# Patient Record
(demographics unavailable — no encounter records)

---

## 2017-11-16 NOTE — OR
[facility]
                                    [address], [place]  [address]
_________________________________________________________________________________________
                                                                 Signed   
 
 
DATE OF OPERATION:
11/14/2017
 
SURGEON: Dave Arroyo MD
 
PREOPERATIVE DIAGNOSES: Acalculous cholecystitis, gallbladder sludge with biliary pain.
 
POSTOPERATIVE DIAGNOSIS: Acalculous cholecystitis, gallbladder sludge with biliary pain.
 
PROCEDURES: 1. Laparoscopic cholecystectomy with intraoperative cholangiogram. 2.
Surgeon-directed fluoroscopy.
 
SURGEON: Dave Arroyo M.D.
 
ANESTHESIA: General endotracheal (Jairo Coronado CRNA) and local 20 mL of 0.25% Marcaine
with 
epinephrine.
 
INDICATION: This 50-year-old white woman is the patient Dr. Ignacio Arredondo and was recently
seen by him 
with recurrence of right upper abdominal, epigastric, and right subscapular pain.  She 
had similar episodes a number of months ago and symptoms have been worsening lately.  At 
that time, she underwent a gallbladder ultrasound, which showed biliary sludge.  The 
patient has had accelerating symptoms lately and highly suggestive of a biliary disease. 
She has incidentally noted to have urinary tract infection, for which I treated her with 
Bactrim DS 1 p.o. b.i.d. recently. 
 
She is admitted at this time to undergo cholecystectomy, preferred by a laparoscopic 
approach.  She understands the risks of bleeding, infection, bile duct injury, need for 
open procedure, and most importantly failure to cure her symptoms.  She understands this 
and wished to proceed.
 
FINDINGS: Gallbladder was chronically inflamed.  The liver was normal.  Intraoperative
cholangiogram was normal as well.  The gallbladder once opened showed chronic 
inflammatory change of the mucosa and some biliary sludge, but no sign of stones.
 
DESCRIPTION OF PROCEDURE: The patient was brought to the operating room, given a general
endotracheal anesthetic.  
Preoperative antibiotic Ancef was given.  Sequential compression device stockings used.  
Heparin was administered subcutaneously.  The abdomen was prepared with a chlorhexidine 
solution after satisfactory general endotracheal anesthesia, and sterilely draped.  
Infraumbilical incision was made and using an open Annie cannula technique 
 
    Electronically Signed By: DAVE ARROYO MD  11/16/17 0826
_________________________________________________________________________________________
PATIENT NAME:     ANAHI RIVAS                 
MEDICAL RECORD #: [id number]                     OPERATIVE REPORT              
          ACCT #: [id number]  
DATE OF BIRTH:   11/07/67                                       
PHYSICIAN:   DAVE ARROYO MD                      REPORT #: [id number]
REPORT IS CONFIDENTIAL AND NOT TO BE RELEASED WITHOUT AUTHORIZATION
 
 
                                  [facility]
                                    [address], [place]  [address]
_________________________________________________________________________________________
                                                                 Signed   
 
 
pneumoperitoneum achieved to a level of 14 mmHg of carbon dioxide gas.  Intraabdominal 
inspection showed no sign of ascites or carcinomatosis.  The liver appeared normal.  The 
gallbladder was obscured initially.  Three additional trocars were placed in usual 
configuration in the subxiphoid, right midclavicular, and right anterior axillary line.  
Gallbladder was elevated cephalad and retracted laterally.  Using blunt electrocautery 
dissection, the triangle of Calot dissected free identifying well the cystic duct and 
cystic artery.  A clip was applied across the gallbladder cystic duct junction and 
transverse choledochotomy made the cystic duct.  Retrograde milking of the duct showed a 
thick yellowish bile.  There was no sign of stone.  Using an Peck type 
cholangiocatheter, intraoperative cholangiography was undertaken showing free flow of 
contrast in the biliary tree with prompt emptying into the duodenum.  There was minimal 
retrograde flow and on that basis, morphine 4 mg was administered by the anesthetist and 
after a few minutes of waiting, repeat cholangiography was undertaken at this time 
showing retrograde filling into the proximal biliary tree and showing a normal biliary 
anatomy. 
 
The catheter was removed.  The cystic duct was triply clipped and divided, and the 
gallbladder dissected free in a retrograde fashion using electrocautery.  The gallbladder
was extracted through the infraumbilical port site without problem, opened on the back 
table and found to have thick biliary sludge as well as chronic inflammatory change of 
the mucosa.  There was no sign of neoplasm.  Reinspection of the subhepatic space showed 
no sign of bile leak, bleeding, or other problems.  Excess irrigation of fluid was 
suctioned free and the trocars were removed under direct visualization.  The 
inferior-most 5 mm trocar site on the right side had a minimal amount of oozing, this was
secured easily with electrocautery. 
 
Plans were then made for closure.  The infraumbilical fascial incision was reapproximated
with interrupted 0 Vicryl suture.  All wounds were copiously irrigated with saline 
solution and 20 mL of 0.25% Marcaine with epinephrine was injected in the trocar sites.  
The skin was closed with interrupted 3-0 Vicryl.  Steri-Strips were applied. 
 
She was ultimately extubated and transferred to recovery in good condition having 
suffered no complication.  Sponge, needle, and instrument counts reported as correct 
x3.
 
 
 
 
________________________________________
 
 
Dave Arroyo MD 
 
    Electronically Signed By: DAVE ARROYO MD  11/16/17 0826
_________________________________________________________________________________________
PATIENT NAME:     ANAHI RIVAS                 
MEDICAL RECORD #: [id number]                     OPERATIVE REPORT              
          ACCT #: [id number]  
DATE OF BIRTH:   11/07/67                                       
PHYSICIAN:   DAVE ARROYO MD                      REPORT #: [id number]
REPORT IS CONFIDENTIAL AND NOT TO BE RELEASED WITHOUT AUTHORIZATION
 
 
                                  [address]  [address]
_________________________________________________________________________________________
                                                                 Signed   
 
 
 
 
 
/LENL
Job #:  [id number]
DD:  11/14/2017 13:24:42
DT:  11/14/2017 20:04:46
 
cc:
TRESSA Arredondo MD
 
 
 
 
 
 
 
 
 
 
 
 
 
 
 
 
 
 
 
 
 
 
 
 
 
 
 
 
 
 
 
 
 
    Electronically Signed By: DAEV ARROYO MD  11/16/17 0826
_________________________________________________________________________________________
PATIENT NAME:     ANAHI RIVAS                 
MEDICAL RECORD #: [id number]                     OPERATIVE REPORT              
          ACCT #: [id number]  
DATE OF BIRTH:   11/07/67                                       
PHYSICIAN:   DAVE ARROYO MD                      REPORT #: [id number]
REPORT IS CONFIDENTIAL AND NOT TO BE RELEASED WITHOUT AUTHORIZATION

## 2022-01-01 NOTE — NUR
ICED WATER GIVEN. CALL LIGHT W/IN REACH. PT DENIES DESIRE FOR FOOD @ THIS
TIME.
LE 1640: PT UP TO BR W/RN STANDBY. PT AMBULATES WELL AND VOIDS 200 ML CLEAR
YELLOW URINE AND REQ DC HOME. PT DRESSES SELF AND TOLERATES THAT WELL. DC
INSTRUCTIONS GIVEN AND PT VERBALIZES UNDERSTANDING. PT TRANSFERS SELF TO 
AND TO PERSONAL VEHICLE WELL.
MORE ICED WATER GIVEN. PT REQ ADD'L PRN FOR PAIN. PT CONTINUES TO DENY NAUSEA.
PT PUSHES CALL LIGHT TO REPORT "THE PAIN MEDICINE ISN'T WORKING". PT
RESTLESSLY MOVING LEGS. ADD'L PRN GIVEN. PT GIVEN HER CELL PHONE FROM HER BAG.
PT REPORTS INCREASED PAIN IN RUQ. RIGHT LOWER INCISION REINFORCED W/GAUZE AND
TAPE D/T DRAINAGE THROUGH THE GOWN. PT TOLERATES REINFORCEMENT WELL. JELLO
GIVEN. PT EATS AND TOLERATES THAT WELL. MORE ICED WATER GIVEN.
4D